# Patient Record
Sex: FEMALE | Race: WHITE | Employment: FULL TIME | URBAN - METROPOLITAN AREA
[De-identification: names, ages, dates, MRNs, and addresses within clinical notes are randomized per-mention and may not be internally consistent; named-entity substitution may affect disease eponyms.]

---

## 2019-01-11 PROCEDURE — 87591 N.GONORRHOEAE DNA AMP PROB: CPT | Performed by: NURSE PRACTITIONER

## 2019-01-11 PROCEDURE — 87491 CHLMYD TRACH DNA AMP PROBE: CPT | Performed by: NURSE PRACTITIONER

## 2019-02-05 PROCEDURE — 87086 URINE CULTURE/COLONY COUNT: CPT | Performed by: UROLOGY

## 2019-02-05 PROCEDURE — 87186 SC STD MICRODIL/AGAR DIL: CPT | Performed by: UROLOGY

## 2019-02-05 PROCEDURE — 87077 CULTURE AEROBIC IDENTIFY: CPT | Performed by: UROLOGY

## 2019-09-20 ENCOUNTER — HOSPITAL ENCOUNTER (INPATIENT)
Facility: HOSPITAL | Age: 29
LOS: 2 days | Discharge: HOME | DRG: 661 | End: 2019-09-22
Attending: HOSPITALIST | Admitting: HOSPITALIST
Payer: COMMERCIAL

## 2019-09-20 ENCOUNTER — APPOINTMENT (OUTPATIENT)
Dept: CT IMAGING | Facility: HOSPITAL | Age: 29
DRG: 661 | End: 2019-09-20
Attending: UROLOGY
Payer: COMMERCIAL

## 2019-09-20 PROBLEM — N12 PYELONEPHRITIS: Status: ACTIVE | Noted: 2019-09-20

## 2019-09-20 PROCEDURE — 81001 URINALYSIS AUTO W/SCOPE: CPT | Performed by: UROLOGY

## 2019-09-20 PROCEDURE — 87086 URINE CULTURE/COLONY COUNT: CPT | Performed by: UROLOGY

## 2019-09-20 PROCEDURE — 80048 BASIC METABOLIC PNL TOTAL CA: CPT | Performed by: UROLOGY

## 2019-09-20 PROCEDURE — 85025 COMPLETE CBC W/AUTO DIFF WBC: CPT | Performed by: UROLOGY

## 2019-09-20 PROCEDURE — 74176 CT ABD & PELVIS W/O CONTRAST: CPT | Performed by: UROLOGY

## 2019-09-20 RX ORDER — MORPHINE SULFATE 4 MG/ML
4 INJECTION, SOLUTION INTRAMUSCULAR; INTRAVENOUS EVERY 2 HOUR PRN
Status: DISCONTINUED | OUTPATIENT
Start: 2019-09-20 | End: 2019-09-22

## 2019-09-20 RX ORDER — LEVONORGESTREL AND ETHINYL ESTRADIOL 0.15-0.03
1 KIT ORAL NIGHTLY
COMMUNITY
End: 2020-02-18

## 2019-09-20 RX ORDER — METOCLOPRAMIDE HYDROCHLORIDE 5 MG/ML
10 INJECTION INTRAMUSCULAR; INTRAVENOUS EVERY 8 HOURS PRN
Status: DISCONTINUED | OUTPATIENT
Start: 2019-09-20 | End: 2019-09-20

## 2019-09-20 RX ORDER — ONDANSETRON 2 MG/ML
4 INJECTION INTRAMUSCULAR; INTRAVENOUS EVERY 6 HOURS PRN
Status: DISCONTINUED | OUTPATIENT
Start: 2019-09-20 | End: 2019-09-22

## 2019-09-20 RX ORDER — SODIUM CHLORIDE 9 MG/ML
INJECTION, SOLUTION INTRAVENOUS CONTINUOUS
Status: DISCONTINUED | OUTPATIENT
Start: 2019-09-20 | End: 2019-09-22

## 2019-09-20 RX ORDER — MORPHINE SULFATE 4 MG/ML
1 INJECTION, SOLUTION INTRAMUSCULAR; INTRAVENOUS EVERY 2 HOUR PRN
Status: DISCONTINUED | OUTPATIENT
Start: 2019-09-20 | End: 2019-09-22

## 2019-09-20 RX ORDER — LEVONORGESTREL AND ETHINYL ESTRADIOL 0.15-0.03
1 KIT ORAL NIGHTLY
Status: DISCONTINUED | OUTPATIENT
Start: 2019-09-20 | End: 2019-09-22

## 2019-09-20 RX ORDER — ACETAMINOPHEN 325 MG/1
650 TABLET ORAL EVERY 6 HOURS PRN
Status: DISCONTINUED | OUTPATIENT
Start: 2019-09-20 | End: 2019-09-22

## 2019-09-20 RX ORDER — MORPHINE SULFATE 4 MG/ML
2 INJECTION, SOLUTION INTRAMUSCULAR; INTRAVENOUS EVERY 2 HOUR PRN
Status: DISCONTINUED | OUTPATIENT
Start: 2019-09-20 | End: 2019-09-22

## 2019-09-20 NOTE — H&P
.CC: renal colic, pyelonephritis     PCP: None Pcp    History of Present Illness: Patient is a 33year old female with PMH sig for nephrolithiasis who presented to [facility] yesterday with severe R flank pain, not alleviated with over the counte pending    ASSESSMENT / PLAN:     1) Staghorn calculus- IVF, morphine prn, zofran prn.   - Urology consult. Will ask Gerard to send available records. Hopefully urology team can access the direct images through [facility] system  - NPO for now    2) ? pyelo

## 2019-09-21 ENCOUNTER — ANESTHESIA (OUTPATIENT)
Dept: SURGERY | Facility: HOSPITAL | Age: 29
DRG: 661 | End: 2019-09-21
Payer: COMMERCIAL

## 2019-09-21 ENCOUNTER — ANESTHESIA EVENT (OUTPATIENT)
Dept: SURGERY | Facility: HOSPITAL | Age: 29
DRG: 661 | End: 2019-09-21
Payer: COMMERCIAL

## 2019-09-21 PROCEDURE — 81025 URINE PREGNANCY TEST: CPT | Performed by: HOSPITALIST

## 2019-09-21 PROCEDURE — 85025 COMPLETE CBC W/AUTO DIFF WBC: CPT | Performed by: HOSPITALIST

## 2019-09-21 PROCEDURE — 80048 BASIC METABOLIC PNL TOTAL CA: CPT | Performed by: HOSPITALIST

## 2019-09-21 PROCEDURE — BT1DYZZ FLUOROSCOPY OF RIGHT KIDNEY, URETER AND BLADDER USING OTHER CONTRAST: ICD-10-PCS | Performed by: UROLOGY

## 2019-09-21 PROCEDURE — 0T768DZ DILATION OF RIGHT URETER WITH INTRALUMINAL DEVICE, VIA NATURAL OR ARTIFICIAL OPENING ENDOSCOPIC: ICD-10-PCS | Performed by: UROLOGY

## 2019-09-21 DEVICE — STENT URET 6F 26CM WO GW INL: Type: IMPLANTABLE DEVICE | Site: URETER | Status: FUNCTIONAL

## 2019-09-21 RX ORDER — LIDOCAINE HYDROCHLORIDE 20 MG/ML
JELLY TOPICAL AS NEEDED
Status: DISCONTINUED | OUTPATIENT
Start: 2019-09-21 | End: 2019-09-21 | Stop reason: HOSPADM

## 2019-09-21 RX ORDER — NALOXONE HYDROCHLORIDE 0.4 MG/ML
80 INJECTION, SOLUTION INTRAMUSCULAR; INTRAVENOUS; SUBCUTANEOUS AS NEEDED
Status: DISCONTINUED | OUTPATIENT
Start: 2019-09-21 | End: 2019-09-21 | Stop reason: HOSPADM

## 2019-09-21 RX ORDER — SODIUM CHLORIDE, SODIUM LACTATE, POTASSIUM CHLORIDE, CALCIUM CHLORIDE 600; 310; 30; 20 MG/100ML; MG/100ML; MG/100ML; MG/100ML
INJECTION, SOLUTION INTRAVENOUS CONTINUOUS
Status: DISCONTINUED | OUTPATIENT
Start: 2019-09-21 | End: 2019-09-21 | Stop reason: HOSPADM

## 2019-09-21 RX ORDER — DEXAMETHASONE SODIUM PHOSPHATE 4 MG/ML
4 VIAL (ML) INJECTION AS NEEDED
Status: DISCONTINUED | OUTPATIENT
Start: 2019-09-21 | End: 2019-09-21 | Stop reason: HOSPADM

## 2019-09-21 RX ORDER — MEPERIDINE HYDROCHLORIDE 25 MG/ML
12.5 INJECTION INTRAMUSCULAR; INTRAVENOUS; SUBCUTANEOUS AS NEEDED
Status: DISCONTINUED | OUTPATIENT
Start: 2019-09-21 | End: 2019-09-21 | Stop reason: HOSPADM

## 2019-09-21 RX ORDER — HYDROMORPHONE HYDROCHLORIDE 1 MG/ML
0.4 INJECTION, SOLUTION INTRAMUSCULAR; INTRAVENOUS; SUBCUTANEOUS EVERY 5 MIN PRN
Status: DISCONTINUED | OUTPATIENT
Start: 2019-09-21 | End: 2019-09-21 | Stop reason: HOSPADM

## 2019-09-21 RX ORDER — ONDANSETRON 2 MG/ML
4 INJECTION INTRAMUSCULAR; INTRAVENOUS AS NEEDED
Status: DISCONTINUED | OUTPATIENT
Start: 2019-09-21 | End: 2019-09-21 | Stop reason: HOSPADM

## 2019-09-21 NOTE — PLAN OF CARE
Problem: Kidney stone, status post cysto and stent placement    Data: Patient alert and oriented. Patient had cystoscopy with stent placement this shift. Patient denies pain now. Patient voiding freely, straining urine, no stone noted at this time.  Patient interventions   Outcome: Progressing  Goal: Patient/Family Short Term Goal  Description  Patient's Short Term Goal:   9/21 AM: Pain control    Interventions:   - IV Morphine  - See additional Care Plan goals for specific interventions   Outcome: Progressin

## 2019-09-21 NOTE — PROGRESS NOTES
I was contacted at [phone] by EDILSON Lou, [facility] about a \"heads up\" on this patient of Dr. Jade Bennett for transfer from [facility] for possible infected/obstructed stone.       I was not contacted until 1953, 4 hours later, about pat

## 2019-09-21 NOTE — ANESTHESIA POSTPROCEDURE EVALUATION
Antonio Patient Status:  Inpatient   Age/Gender 33year old female MRN [id number]   [facility] SURGERY Attending Ana Mendoza MD   Hosp Day # 1 PCP None Pcp       Anesthesia Post-op Note    Procedure(s):  CYSTOSCOP

## 2019-09-21 NOTE — PLAN OF CARE
NURSING ADMISSION NOTE      Patient admitted via direct admit  Oriented to room. Safety precautions initiated. Bed in low position. Call light in reach. Updated history and pta meds.  Gave report to Tomy

## 2019-09-21 NOTE — PLAN OF CARE
Assumed care from [address] [place] [address] at 1500. A&Ox4. VSS. Afebrile. Tolerating regular diet. Voiding well. Straining urine. Denies nausea. Up ad jenny. IVF infusing. POC updated.          Problem: PAIN - ADULT  Goal: Verbalizes/displays adequate comfort level or patie

## 2019-09-21 NOTE — PROGRESS NOTES
Rita Ruiz Hospitalist note    PCP: None Pcp    Chief Complaint:  F/u staghorn calculus    SUBJECTIVE:  Went to OR today. No sig pain, no nausea, no sob.      OBJECTIVE:  Temp:  [97.7 °F (36.5 °C)-99.5 °F (37.5 °C)] 99.5 °F (37.5 °C)  Pulse:  [81-98] 81  Resp: HCl       Assessment/Plan:     1) Staghorn calculus-   - appreciate urology recs  - CT abd done here last night with moderate R hydronephrosis and hydroureter in setting of R staghorn calculus.  None seen on L  - underwent cysto/R ureteral stent placement t

## 2019-09-21 NOTE — ANESTHESIA PREPROCEDURE EVALUATION
PRE-OP EVALUATION    Patient Name: Yessy Beltre    Pre-op Diagnosis: Kidney stone on right side [N20.0]    Procedure(s):  CYSTOSCOPY , RETROGRADE, PYELOGRAM, URETEROSCOPY , STONE MANIPULATION WITH HOLUM LASER AND INSERTION URETERAL STENT- RIGHT    Surg ago)                     Past Surgical History:   Procedure Laterality Date   • CYSTOSCOPY,INSERT URETERAL STENT       Social History    Tobacco Use      Smoking status: Former Smoker        Packs/day: 0.50        Years: 2.00        Pack years: 1        [facility]

## 2019-09-21 NOTE — CONSULTS
[facility]  Report of Consultation    Ericka Mcknight Patient Status:  Inpatient    1990 MRN [id number]   [facility] 3NW-A Attending Felix Magaña MD   Hosp Day # 1 PCP None Pcp     Reason for Consultation:  Right flank pain, ki morphINE sulfate (PF) 4 MG/ML injection 4 mg, 4 mg, Intravenous, Q2H PRN  •  ondansetron HCl (ZOFRAN) injection 4 mg, 4 mg, Intravenous, Q6H PRN  •  CefTRIAXone Sodium (ROCEPHIN) 1 g in sodium chloride 0.9% 100 mL MBP/ADD-vantage, 1 g, Intravenous, Q24H  • will likely requiring several stone surgeries. Discussed risks of surgery including infection/sepsis, bleeding, damage to surrounding organs, ureteral stricture, need for additional surgery. Recommend urorisk and restart medication to prevent stones.

## 2019-09-21 NOTE — OPERATIVE REPORT
[facility]  Operative Note     Dossie Jaime Location: OR   [facility] [id number] MRN [id number]   Admission Date 9/20/2019 Operation Date 9/21/2019   Attending Physician Corinne Foss MD Operating Physician Kayode Patterson MD      Preoperative Stefani properly padded. 24 Lao cystoscopic sheath and apparatus was used to navigate a normal anterior and posterior urethra, entry into the urinary bladder was unremarkable. Cystoscopy demonstrated no retained urothelial mass, tumor, stone, defect.   Fernando

## 2019-09-21 NOTE — PROGRESS NOTES
Pt AOx4. Resting in bed. Denies urinary symptoms. Pain to right flank managed with morphine. Voiding. Up ad jenny. SCDs are on. IVF. Iv placed to right wrist by ICU nurse, tolerated well. Will continue to monitor. 1945 page to urology for new consult.  Ju Smith

## 2019-09-22 VITALS
TEMPERATURE: 99 F | DIASTOLIC BLOOD PRESSURE: 70 MMHG | OXYGEN SATURATION: 100 % | HEART RATE: 80 BPM | RESPIRATION RATE: 15 BRPM | SYSTOLIC BLOOD PRESSURE: 112 MMHG

## 2019-09-22 RX ORDER — PHENAZOPYRIDINE HYDROCHLORIDE 100 MG/1
100 TABLET, FILM COATED ORAL 3 TIMES DAILY PRN
Qty: 20 TABLET | Refills: 0 | Status: SHIPPED | OUTPATIENT
Start: 2019-09-22 | End: 2019-10-04

## 2019-09-22 RX ORDER — PHENAZOPYRIDINE HYDROCHLORIDE 100 MG/1
100 TABLET, FILM COATED ORAL
Status: DISCONTINUED | OUTPATIENT
Start: 2019-09-22 | End: 2019-09-22

## 2019-09-22 RX ORDER — CEPHALEXIN 500 MG/1
500 CAPSULE ORAL 2 TIMES DAILY
Qty: 10 CAPSULE | Refills: 0 | Status: SHIPPED | OUTPATIENT
Start: 2019-09-22 | End: 2019-10-04

## 2019-09-22 NOTE — PROGRESS NOTES
[facility]    Progress Note    Jeovany Andrade Patient Status:  Inpatient    1990 MRN [id number]   [facility] 3NW-A Attending Skinny Sloan MD   [facility] Day # 2 PCP None Pcp        Subjective:   Jeovany Andrade is a(n) 33year old Abdomen+pelvis Kidneystone 2d Rndr(no Iv,no Oral)(cpt=74176)    Result Date: 9/20/2019  CONCLUSION:  1. Moderate right hydronephrosis and hydroureter without obstructing ureteral calculus or other cause.   The possibility of chronic vesicoureteral reflux is

## 2019-09-22 NOTE — PLAN OF CARE
Patient continues to report right flank discomfort but denies need for medication. Complaining of feelings of urgency and requesting Pyridium as this has worked for her in the past. [address] paged regarding Pyridium, will await response.

## 2019-09-22 NOTE — PLAN OF CARE
Problem: PAIN - ADULT  Goal: Verbalizes/displays adequate comfort level or patient's stated pain goal  Description  INTERVENTIONS:  - Encourage pt to monitor pain and request assistance  - Assess pain using appropriate pain scale  - Administer analgesics noted. Patient complaining of  dysuria and frequency but states no worse than she usually has with stents. Tolerating regular diet.   Patient refusing to wear SCD's, writing RN explained the benefits of wearing SCD's to the patient and the risks associated

## 2019-09-22 NOTE — DISCHARGE SUMMARY
[facility] Discharge Summary    Patient ID:  Bhavana Stanford  [id number]  88 year old  11/22/1990    Admit date: 9/20/2019  Discharge date and time: 9/22/19  Attending Physician: Radha Loera MD  Primary Care Physician: None Pcp     Admit Dx ambulance- pt was therefore not sent with any records. Hospital Course:   1) Staghorn calculus-   - appreciate urology recs  - CT abd done here with moderate R hydronephrosis and hydroureter in setting of R staghorn calculus.  None seen on L  - underwen ORAL) (CPT=74176)  COMPARISON:  None. INDICATIONS:  kidney stone  TECHNIQUE:  Unenhanced multislice CT scanning from above the kidneys to below the urinary bladder.   2D rendering are generated on the CT scanner workstation to localize potential stones in vesicoureteral reflux is considered. 2. Right nephrolithiasis with large staghorn calculus. A separate 14 mm lower pole calculus associated with cortical thinning and may be within a calyceal diverticulum.    Dictated by: Trevor Juan MD on 9/20/2019 at 2

## 2019-10-05 PROCEDURE — 87086 URINE CULTURE/COLONY COUNT: CPT | Performed by: PHYSICIAN ASSISTANT

## 2019-10-10 ENCOUNTER — APPOINTMENT (OUTPATIENT)
Dept: CT IMAGING | Facility: HOSPITAL | Age: 29
End: 2019-10-10
Attending: EMERGENCY MEDICINE
Payer: COMMERCIAL

## 2019-10-10 ENCOUNTER — HOSPITAL ENCOUNTER (OUTPATIENT)
Facility: HOSPITAL | Age: 29
Setting detail: OBSERVATION
Discharge: HOME | End: 2019-10-12
Attending: EMERGENCY MEDICINE | Admitting: HOSPITALIST
Payer: COMMERCIAL

## 2019-10-10 DIAGNOSIS — R11.2 NAUSEA AND VOMITING IN ADULT: ICD-10-CM

## 2019-10-10 DIAGNOSIS — N20.0 STAGHORN CALCULUS: ICD-10-CM

## 2019-10-10 DIAGNOSIS — R10.9 RIGHT FLANK PAIN: Primary | ICD-10-CM

## 2019-10-10 PROCEDURE — 99220 INITIAL OBSERVATION CARE,LEVL III: CPT | Performed by: HOSPITALIST

## 2019-10-10 PROCEDURE — 74176 CT ABD & PELVIS W/O CONTRAST: CPT | Performed by: EMERGENCY MEDICINE

## 2019-10-10 RX ORDER — IBUPROFEN 200 MG
200 TABLET ORAL EVERY 4 HOURS PRN
Status: DISCONTINUED | OUTPATIENT
Start: 2019-10-10 | End: 2019-10-12

## 2019-10-10 RX ORDER — MORPHINE SULFATE 4 MG/ML
2 INJECTION, SOLUTION INTRAMUSCULAR; INTRAVENOUS EVERY 2 HOUR PRN
Status: DISCONTINUED | OUTPATIENT
Start: 2019-10-10 | End: 2019-10-12

## 2019-10-10 RX ORDER — ONDANSETRON 2 MG/ML
4 INJECTION INTRAMUSCULAR; INTRAVENOUS EVERY 6 HOURS PRN
Status: DISCONTINUED | OUTPATIENT
Start: 2019-10-10 | End: 2019-10-12

## 2019-10-10 RX ORDER — IBUPROFEN 600 MG/1
600 TABLET ORAL EVERY 4 HOURS PRN
Status: DISCONTINUED | OUTPATIENT
Start: 2019-10-10 | End: 2019-10-12

## 2019-10-10 RX ORDER — IBUPROFEN 400 MG/1
400 TABLET ORAL EVERY 4 HOURS PRN
Status: DISCONTINUED | OUTPATIENT
Start: 2019-10-10 | End: 2019-10-12

## 2019-10-10 RX ORDER — ACETAMINOPHEN 325 MG/1
650 TABLET ORAL EVERY 6 HOURS PRN
Status: DISCONTINUED | OUTPATIENT
Start: 2019-10-10 | End: 2019-10-12

## 2019-10-10 RX ORDER — ONDANSETRON 2 MG/ML
4 INJECTION INTRAMUSCULAR; INTRAVENOUS ONCE
Status: COMPLETED | OUTPATIENT
Start: 2019-10-10 | End: 2019-10-10

## 2019-10-10 RX ORDER — MORPHINE SULFATE 4 MG/ML
4 INJECTION, SOLUTION INTRAMUSCULAR; INTRAVENOUS EVERY 2 HOUR PRN
Status: DISCONTINUED | OUTPATIENT
Start: 2019-10-10 | End: 2019-10-12

## 2019-10-10 RX ORDER — MORPHINE SULFATE 4 MG/ML
4 INJECTION, SOLUTION INTRAMUSCULAR; INTRAVENOUS ONCE
Status: COMPLETED | OUTPATIENT
Start: 2019-10-10 | End: 2019-10-10

## 2019-10-10 RX ORDER — PHENAZOPYRIDINE HYDROCHLORIDE 200 MG/1
200 TABLET, FILM COATED ORAL 3 TIMES DAILY PRN
Status: DISCONTINUED | OUTPATIENT
Start: 2019-10-10 | End: 2019-10-12

## 2019-10-10 RX ORDER — SODIUM CHLORIDE 9 MG/ML
INJECTION, SOLUTION INTRAVENOUS CONTINUOUS
Status: ACTIVE | OUTPATIENT
Start: 2019-10-10 | End: 2019-10-11

## 2019-10-10 RX ORDER — HYDROMORPHONE HYDROCHLORIDE 1 MG/ML
0.5 INJECTION, SOLUTION INTRAMUSCULAR; INTRAVENOUS; SUBCUTANEOUS EVERY 30 MIN PRN
Status: ACTIVE | OUTPATIENT
Start: 2019-10-10 | End: 2019-10-11

## 2019-10-10 RX ORDER — LEVONORGESTREL AND ETHINYL ESTRADIOL 0.15-0.03
1 KIT ORAL NIGHTLY
Status: DISCONTINUED | OUTPATIENT
Start: 2019-10-10 | End: 2019-10-12

## 2019-10-10 RX ORDER — ENOXAPARIN SODIUM 100 MG/ML
40 INJECTION SUBCUTANEOUS DAILY
Status: DISCONTINUED | OUTPATIENT
Start: 2019-10-10 | End: 2019-10-12

## 2019-10-10 RX ORDER — SODIUM CHLORIDE 9 MG/ML
INJECTION, SOLUTION INTRAVENOUS CONTINUOUS
Status: DISCONTINUED | OUTPATIENT
Start: 2019-10-10 | End: 2019-10-12

## 2019-10-10 RX ORDER — MORPHINE SULFATE 4 MG/ML
1 INJECTION, SOLUTION INTRAMUSCULAR; INTRAVENOUS EVERY 2 HOUR PRN
Status: DISCONTINUED | OUTPATIENT
Start: 2019-10-10 | End: 2019-10-12

## 2019-10-11 PROCEDURE — 99225 SUBSEQUENT OBSERVATION CARE: CPT | Performed by: INTERNAL MEDICINE

## 2019-10-11 RX ORDER — CETIRIZINE HYDROCHLORIDE 10 MG/1
10 TABLET ORAL AS NEEDED
COMMUNITY
End: 2020-02-18 | Stop reason: ALTCHOICE

## 2019-10-11 NOTE — H&P
[facility]  History and Physical     Danaeeliel Sims Patient Status:  Emergency    1990 MRN [id number]   Location [address] [facility] Attending Ozzy Weir MD   Hosp Day # 0 PCP None Pcp     Chief Complaint: flank Grandfather 64        esophageal   • Alcohol and Other Disorders Associated Paternal Grandmother    • Other (Other) Paternal Grandmother         chirrosis liver   • Alcohol and Other Disorders Associated Paternal Grandfather    • Other (Other) Paternal Gra GFRNAA 87   CA 9.2   ALB 3.5   *   K 4.1      CO2 23.0   ALKPHO 67   AST 34   ALT 30   BILT 0.4   TP 7.8     No results for input(s): PTP, INR in the last 168 hours. No results for input(s): TROP, CK in the last 168 hours.   Imaging: Imaging

## 2019-10-11 NOTE — CONSULTS
[facility] Urology   Consultation Note    Catina Dan Patient Status:  Observation    1990 MRN [id number]   Location [address] [place] [address] Attending Yasmeen Crouch MD   Hosp Day # 0 PCP None Pcp     Reason for Consultation:  Flank Other (Other) Paternal Grandmother         chirrosis liver   • Alcohol and Other Disorders Associated Paternal Grandfather    • Other (Other) Paternal Grandfather         jorge      reports that she quit smoking about 11 years ago.  She has a 1.00 pack BACK: Without CVA tenderness. SKIN: Without stigmata. NEUROLOGIC: Grossly intact. EXTREMITIES: Without edema. Laboratory Data:  Lab Results   Component Value Date    WBC 8.1 10/10/2019    HGB 13.2 10/10/2019    HCT 38.8 10/10/2019    . 0 pleural disease. BLADDER:  Negative.       =====  CONCLUSION:    1. Moderate right hydronephrosis and hydroureter without obstructing ureteral calculus or other cause. The possibility of chronic vesicoureteral reflux is considered.   2. Right nephrolith stones     Hematuria     Pyuria     Ureteral stone     Kidney stone     Staghorn calculus     Flank pain     Ureteral stone with hydronephrosis     Renal colic     Renal stone     Pyelonephritis     Right flank pain     Nausea and vomiting in adult    [facility]

## 2019-10-11 NOTE — ED INITIAL ASSESSMENT (HPI)
A/O x 4. Patient presents with right flank pain. Patient has a history of kidney stones, had a stent placed a few weeks ago and has not been able to see the urologist.  Reports nausea and vomiting.

## 2019-10-11 NOTE — PROGRESS NOTES
[facility]  Progress Note     Tisha Ayala Patient Status:  Observation    1990 MRN [id number]   Location [address] [place] [address]-A Attending Adan Ochoa MD   Hosp Day # 0 PCP None Pcp     Chief Complaint: flank pain    S: Patient with 1. Right hydroureteronephrosis due to Staghorn stone-   1. S/p stent  2. Pain control   3. abx - awaiting Ucx  4. Antiemetic   5. Urology consult   6. Nephrostomy tube as outpatient. 2. UTI- abx and f/u Cx   3.  Hyponatremia- IVF and monitor      Qual

## 2019-10-11 NOTE — PLAN OF CARE
NURSING ADMISSION NOTE      Patient admitted via cart from ER to Rm 0018  C/o right flank pain,nausea vomiting  Mild right flank pain, refused pain med. Denies nausea  Able to eat gabriel crackers, drank apple juice  NPO post MN  IVF.   Urology to see, pe patient/family/discharge partner in discharge planning  - Arrange for needed discharge resources and transportation as appropriate  - Identify discharge learning needs (meds, wound care, etc)  - Arrange for interpreters to assist at discharge as needed  -

## 2019-10-11 NOTE — ED PROVIDER NOTES
Patient Seen in: [facility] Emergency Department      History   Patient presents with:  Abdomen/Flank Pain (GI/)    Stated Complaint: flank pain, recent kidney stones    HPI    CHIEF COMPLAINT: Right flank pain, recently diagnosed staghorn calculu noncontributory to the presenting problem, except as indicated as above.     Past Medical History:   Diagnosis Date   • Bilateral congenital primary hydronephrosis 1997   • Calculus of kidney    • History of kidney stones    • Seizure disorder ([facility] [address] hypertrophy. no trismus or stridor. Uvula midline. No phonation changes, patient handling secretions well.  Mucous membranes moist.  Respiratory: there are no retractions, lungs are clear to auscultation  Cardiovascular: regular rate and rhythm  Shanelle Juarez CBC W/ DIFFERENTIAL[[id number]]                              Final result                 Please view results for these tests on the individual orders.    POCT PREGNANCY, URINE   RAINBOW DRAW BLUE   RAINBOW DRAW LAVENDER   RAINBOW DRAW LIGHT GREEN   Yesica Willis Stable. Normal bowel caliber. No colonic inflammation. Moderate to large amount of stool scattered throughout the colon. No ascites. Normal appendix. ABDOMINAL WALL:  Stable. Umbilical eventration. BONES:  Stable.   Normal vertebral body heights and d obstructing ureteral calculus is identified. No left urinary tract calculus or hydronephrosis. ADRENALS:  No mass or enlargement. LIVER:  No enlargement, atrophy, abnormal density, or significant focal lesion.   BILIARY:  No visible dilatation or calcific followed by urology. Consults were called by Dr. Shasta Varghese, please see his note for further details. This patient was seen and examined with Dr. Shasta Varghese, who agrees with the disposition and plan.         Disposition and Plan     Clinical Impression:  Right fl

## 2019-10-12 VITALS
RESPIRATION RATE: 18 BRPM | HEART RATE: 104 BPM | TEMPERATURE: 99 F | SYSTOLIC BLOOD PRESSURE: 121 MMHG | WEIGHT: 150 LBS | OXYGEN SATURATION: 100 % | BODY MASS INDEX: 24.11 KG/M2 | DIASTOLIC BLOOD PRESSURE: 70 MMHG | HEIGHT: 66 IN

## 2019-10-12 PROCEDURE — 99217 OBSERVATION CARE DISCHARGE: CPT | Performed by: INTERNAL MEDICINE

## 2019-10-12 RX ORDER — CEPHALEXIN 500 MG/1
500 CAPSULE ORAL 3 TIMES DAILY
Qty: 30 CAPSULE | Refills: 0 | Status: SHIPPED | OUTPATIENT
Start: 2019-10-12 | End: 2019-10-22

## 2019-10-12 NOTE — PLAN OF CARE
Pt seen and evaluated by hospitalist disposition for discharge home. Verbal and written discharge instructions reviewed with pt including reback and verbal understanding of information. Ride not available until [id number].  Information provided to be discharged

## 2019-10-12 NOTE — PROGRESS NOTES
[facility]    Progress Note    Nunu Arteaga Patient Status:  Observation    1990 MRN [id number]   [facility] 0SW-A Attending Toni Cifuentes MD   Hosp Day # 0 PCP None Pcp     Subjective:  Nunu Arteaga is a(n) 33 year

## 2019-10-12 NOTE — DISCHARGE SUMMARY
[facility]  DISCHARGE SUMMARY     [facility] Patient Status:  Observation    1990 MRN [id number]   [facility] 0SW-A Attending No att. providers found   Hosp Day # 0 PCP None Pcp     Date of Admission: 10/10/2019  Date CONTINUE taking these medications      Instructions Prescription details   cetirizine 10 MG Tabs  Commonly known as:  ZYRTEC      Take 10 mg by mouth as needed for Allergies.    Refills:  0     KURVELO 0.15-30 MG-MCG Tabs  Generic drug:  Levonorgestrel-Et edema.  -----------------------------------------------------------------------------------------------  PATIENT DISCHARGE INSTRUCTIONS: See electronic chart    Vasyl Dela Cruz MD    Time spent:  > 30 minutes

## 2019-10-12 NOTE — PLAN OF CARE
Assumed care of patient at 1. VSS. IVF infusing. IV rocephin. Straining all urine. C/o R flank pain denies needs for meds.  Will continue to monitor

## 2019-10-12 NOTE — PLAN OF CARE
Pt care assumed this am, assessment completed, reported pain in pelvis urethra prior to void and burning with urination. Medicated with tylenol versus motrin for pain and prn pyridium. IVF rate decreased to encourage increased po intake.  Ambulating in room

## 2019-10-15 ENCOUNTER — PATIENT OUTREACH (OUTPATIENT)
Dept: CASE MANAGEMENT | Age: 29
End: 2019-10-15

## 2019-10-15 DIAGNOSIS — Z02.9 ENCOUNTERS FOR UNSPECIFIED ADMINISTRATIVE PURPOSE: ICD-10-CM

## 2019-10-15 NOTE — PROGRESS NOTES
Evans Rodriguez [address]      HISTORY   CHIEF COMPLAINT: post hospital follow up visit  HPI: Norma Laguerre is a 33year old female here today for follow up after being hospitalized for recurrent flank pain.   [facility] Paul OR   • CYSTOSCOPY URETEROSCOPY Right 9/21/2019    Performed by Patrick Mei MD at [facility] MAIN OR   • Cas     • LITHOTRIPSY  2009   • OTHER      congenital hydronephrosis at age 4   • OTHER SURGICAL HISTORY  11/18/2014    cysto PM    CREATSERUM 0.90 10/10/2019 07:51 PM    CA 9.2 10/10/2019 07:51 PM    MG 1.5 (L) 10/26/2014 05:49 AM    ALB 3.5 10/10/2019 07:51 PM    ALT 30 10/10/2019 07:51 PM    AST 34 10/10/2019 07:51 PM    INR 1.0 07/31/2015 09:08 AM       Immunization History hours as needed for pain, Disp: 30 tablet, Rfl: 0  cephALEXin 500 MG Oral Cap, Take 1 capsule (500 mg total) by mouth 3 (three) times daily for 10 days. , Disp: 30 capsule, Rfl: 0  cetirizine 10 MG Oral Tab, Take 10 mg by mouth as needed for Allergies. , Dis Decision Making- Based on service period of discharge to 30 days:   · Number of Possible Diagnoses and/or Management Options: moderate  · Amount and/or Complexity of Data to Be Reviewed: moderate  · Risk of Significant Complications, Morbidity, and/or Mort

## 2019-10-15 NOTE — PROGRESS NOTES
LM for pt to call NCM back for TCM. Pt has appt at [facility] tomorrow. TCC was ordered at d/c.

## 2019-10-16 ENCOUNTER — TELEPHONE (OUTPATIENT)
Dept: INTERNAL MEDICINE CLINIC | Facility: CLINIC | Age: 29
End: 2019-10-16

## 2019-10-16 ENCOUNTER — OFFICE VISIT (OUTPATIENT)
Dept: INTERNAL MEDICINE CLINIC | Facility: CLINIC | Age: 29
End: 2019-10-16

## 2019-10-16 VITALS
SYSTOLIC BLOOD PRESSURE: 112 MMHG | RESPIRATION RATE: 16 BRPM | BODY MASS INDEX: 24.59 KG/M2 | HEIGHT: 66 IN | DIASTOLIC BLOOD PRESSURE: 78 MMHG | OXYGEN SATURATION: 94 % | TEMPERATURE: 98 F | WEIGHT: 153 LBS | HEART RATE: 94 BPM

## 2019-10-16 DIAGNOSIS — N20.0 STAGHORN CALCULUS: ICD-10-CM

## 2019-10-16 DIAGNOSIS — N13.2 URETERAL STONE WITH HYDRONEPHROSIS: Primary | ICD-10-CM

## 2019-10-16 PROCEDURE — 1111F DSCHRG MED/CURRENT MED MERGE: CPT | Performed by: CLINICAL NURSE SPECIALIST

## 2019-10-16 PROCEDURE — 99495 TRANSJ CARE MGMT MOD F2F 14D: CPT | Performed by: CLINICAL NURSE SPECIALIST

## 2019-10-16 RX ORDER — IBUPROFEN 200 MG
400 TABLET ORAL EVERY 6 HOURS PRN
COMMUNITY
End: 2019-10-16

## 2019-10-16 RX ORDER — HYDROCODONE BITARTRATE AND ACETAMINOPHEN 5; 325 MG/1; MG/1
TABLET ORAL
Qty: 30 TABLET | Refills: 0 | Status: SHIPPED | OUTPATIENT
Start: 2019-10-16 | End: 2020-02-18 | Stop reason: ALTCHOICE

## 2019-10-16 NOTE — PROGRESS NOTES
[facility] PHARMACIST MEDICATION RECONCILIATION        Patrick Acharya MRN [id number]    1990 PCP None Pcp       Comments: Medication history completed in [address] [place] [address] by pharmacist with the patient.       The following

## 2019-10-16 NOTE — TELEPHONE ENCOUNTER
Spoke with patient to inform her that the Physician Assistant Royal Hendrickson) from Urology should be contacting her tomorrow regarding continuing antibiotic therapy until her procedure.    Also informed patient that we have contacted Dr. Alex Lopez' s office at [facility]

## 2019-10-16 NOTE — PATIENT INSTRUCTIONS
Patient Instructions:    1. We will contact your urologist today regarding the antibiotic to see if you need to continue it until the day of surgery. We will also request an earlier surgery date. We will contact you once we speak with them.      2. Disconti

## 2019-10-18 NOTE — PROGRESS NOTES
Multiple attempts to reach pt and messages left with no return call. Pt went in for TCC appt on 10/16/19. Encounter closing.

## 2022-04-07 ENCOUNTER — HOSPITAL ENCOUNTER (OUTPATIENT)
Age: 32
Discharge: HOME | End: 2022-04-07
Attending: EMERGENCY MEDICINE
Payer: COMMERCIAL

## 2022-04-07 ENCOUNTER — APPOINTMENT (OUTPATIENT)
Dept: CT IMAGING | Age: 32
End: 2022-04-07
Attending: EMERGENCY MEDICINE
Payer: COMMERCIAL

## 2022-04-07 ENCOUNTER — APPOINTMENT (OUTPATIENT)
Dept: URGENT CARE | Age: 32
End: 2022-04-07

## 2022-04-07 VITALS
HEIGHT: 67 IN | BODY MASS INDEX: 21.19 KG/M2 | SYSTOLIC BLOOD PRESSURE: 125 MMHG | RESPIRATION RATE: 16 BRPM | OXYGEN SATURATION: 100 % | HEART RATE: 76 BPM | WEIGHT: 135 LBS | DIASTOLIC BLOOD PRESSURE: 66 MMHG | TEMPERATURE: 99 F

## 2022-04-07 DIAGNOSIS — N12 PYELONEPHRITIS: Primary | ICD-10-CM

## 2022-04-07 DIAGNOSIS — R10.9 ABDOMINAL PAIN OF UNKNOWN ETIOLOGY: ICD-10-CM

## 2022-04-07 DIAGNOSIS — N20.0 KIDNEY STONE: ICD-10-CM

## 2022-04-07 LAB
#MXD IC: 0.8 X10ˆ3/UL (ref 0.1–1)
B-HCG UR QL: NEGATIVE
BUN BLD-MCNC: 13 MG/DL (ref 7–18)
CHLORIDE BLD-SCNC: 102 MMOL/L (ref 98–112)
CO2 BLD-SCNC: 25 MMOL/L (ref 21–32)
CREAT BLD-MCNC: 0.5 MG/DL
GLUCOSE BLD-MCNC: 87 MG/DL (ref 70–99)
HCT VFR BLD AUTO: 41.3 %
HCT VFR BLD CALC: 43 %
HGB BLD-MCNC: 13.8 G/DL
ISTAT IONIZED CALCIUM FOR CHEM 8: 1.16 MMOL/L (ref 1.12–1.32)
LYMPHOCYTES # BLD AUTO: 2.3 X10ˆ3/UL (ref 1–4)
LYMPHOCYTES NFR BLD AUTO: 28.1 %
MCH RBC QN AUTO: 30.8 PG (ref 26–34)
MCHC RBC AUTO-ENTMCNC: 33.4 G/DL (ref 31–37)
MCV RBC AUTO: 92.2 FL (ref 80–100)
MIXED CELL %: 9.8 %
NEUTROPHILS # BLD AUTO: 5.1 X10ˆ3/UL (ref 1.5–7.7)
NEUTROPHILS NFR BLD AUTO: 62.1 %
PLATELET # BLD AUTO: 204 X10ˆ3/UL (ref 150–450)
POCT BILIRUBIN URINE: NEGATIVE
POCT GLUCOSE URINE: NEGATIVE MG/DL
POCT KETONE URINE: NEGATIVE MG/DL
POCT NITRITE URINE: POSITIVE
POCT PH URINE: 7.5 (ref 5–8)
POCT PROTEIN URINE: NEGATIVE MG/DL
POCT SPECIFIC GRAVITY URINE: 1.01
POCT UROBILINOGEN URINE: 0.2 MG/DL
POTASSIUM BLD-SCNC: 4 MMOL/L (ref 3.6–5.1)
RBC # BLD AUTO: 4.48 X10ˆ6/UL
SODIUM BLD-SCNC: 135 MMOL/L (ref 136–145)
WBC # BLD AUTO: 8.2 X10ˆ3/UL (ref 4–11)

## 2022-04-07 PROCEDURE — 81002 URINALYSIS NONAUTO W/O SCOPE: CPT | Performed by: EMERGENCY MEDICINE

## 2022-04-07 PROCEDURE — 87186 SC STD MICRODIL/AGAR DIL: CPT | Performed by: EMERGENCY MEDICINE

## 2022-04-07 PROCEDURE — 96361 HYDRATE IV INFUSION ADD-ON: CPT

## 2022-04-07 PROCEDURE — 87077 CULTURE AEROBIC IDENTIFY: CPT | Performed by: EMERGENCY MEDICINE

## 2022-04-07 PROCEDURE — 99215 OFFICE O/P EST HI 40 MIN: CPT

## 2022-04-07 PROCEDURE — 85025 COMPLETE CBC W/AUTO DIFF WBC: CPT | Performed by: EMERGENCY MEDICINE

## 2022-04-07 PROCEDURE — 81025 URINE PREGNANCY TEST: CPT

## 2022-04-07 PROCEDURE — 96365 THER/PROPH/DIAG IV INF INIT: CPT

## 2022-04-07 PROCEDURE — 99214 OFFICE O/P EST MOD 30 MIN: CPT

## 2022-04-07 PROCEDURE — 96375 TX/PRO/DX INJ NEW DRUG ADDON: CPT

## 2022-04-07 PROCEDURE — 74176 CT ABD & PELVIS W/O CONTRAST: CPT | Performed by: EMERGENCY MEDICINE

## 2022-04-07 PROCEDURE — 80047 BASIC METABLC PNL IONIZED CA: CPT

## 2022-04-07 PROCEDURE — 87086 URINE CULTURE/COLONY COUNT: CPT | Performed by: EMERGENCY MEDICINE

## 2022-04-07 RX ORDER — HYDROCODONE BITARTRATE AND ACETAMINOPHEN 5; 325 MG/1; MG/1
1-2 TABLET ORAL EVERY 6 HOURS PRN
Qty: 10 TABLET | Refills: 0 | Status: SHIPPED | OUTPATIENT
Start: 2022-04-07 | End: 2022-04-12

## 2022-04-07 RX ORDER — ONDANSETRON 2 MG/ML
4 INJECTION INTRAMUSCULAR; INTRAVENOUS ONCE
Status: COMPLETED | OUTPATIENT
Start: 2022-04-07 | End: 2022-04-07

## 2022-04-07 RX ORDER — KETOROLAC TROMETHAMINE 30 MG/ML
15 INJECTION, SOLUTION INTRAMUSCULAR; INTRAVENOUS ONCE
Status: COMPLETED | OUTPATIENT
Start: 2022-04-07 | End: 2022-04-07

## 2022-04-07 RX ORDER — SODIUM CHLORIDE 9 MG/ML
1000 INJECTION, SOLUTION INTRAVENOUS ONCE
Status: COMPLETED | OUTPATIENT
Start: 2022-04-07 | End: 2022-04-07

## 2022-04-07 RX ORDER — CEFADROXIL 500 MG/1
500 CAPSULE ORAL 2 TIMES DAILY
Qty: 20 CAPSULE | Refills: 0 | Status: SHIPPED | OUTPATIENT
Start: 2022-04-07 | End: 2022-04-17

## 2022-04-07 RX ORDER — ONDANSETRON 4 MG/1
4 TABLET, ORALLY DISINTEGRATING ORAL ONCE
Status: COMPLETED | OUTPATIENT
Start: 2022-04-07 | End: 2022-04-07

## 2022-04-07 NOTE — ED INITIAL ASSESSMENT (HPI)
C/O a week of right flank pain radiates to abdominal area, yesterday got worse.  Hx of kidney stones

## 2022-04-11 NOTE — ED NOTES
On appropriate antibiotic for the organism that was found in urine culture.  Told pt via my chart messager,   We hope you are feeling better and call us if you have any further questions

## 2022-12-16 ENCOUNTER — APPOINTMENT (OUTPATIENT)
Dept: CT IMAGING | Age: 32
End: 2022-12-16
Attending: EMERGENCY MEDICINE
Payer: COMMERCIAL

## 2022-12-16 ENCOUNTER — HOSPITAL ENCOUNTER (OUTPATIENT)
Age: 32
Discharge: ANOTHER HOSPITAL | End: 2022-12-16
Attending: EMERGENCY MEDICINE
Payer: COMMERCIAL

## 2022-12-16 VITALS
HEIGHT: 67 IN | SYSTOLIC BLOOD PRESSURE: 128 MMHG | RESPIRATION RATE: 18 BRPM | BODY MASS INDEX: 22.76 KG/M2 | DIASTOLIC BLOOD PRESSURE: 85 MMHG | WEIGHT: 145 LBS | HEART RATE: 84 BPM | OXYGEN SATURATION: 97 % | TEMPERATURE: 99 F

## 2022-12-16 DIAGNOSIS — N23 RENAL COLIC: Primary | ICD-10-CM

## 2022-12-16 DIAGNOSIS — R10.9 FLANK PAIN: ICD-10-CM

## 2022-12-16 DIAGNOSIS — N13.30 HYDRONEPHROSIS OF RIGHT KIDNEY: ICD-10-CM

## 2022-12-16 LAB
#MXD IC: 0.6 X10ˆ3/UL (ref 0.1–1)
B-HCG UR QL: NEGATIVE
BUN BLD-MCNC: 16 MG/DL (ref 7–18)
CHLORIDE BLD-SCNC: 98 MMOL/L (ref 98–112)
CO2 BLD-SCNC: 31 MMOL/L (ref 21–32)
CREAT BLD-MCNC: 0.8 MG/DL
GFR SERPLBLD BASED ON 1.73 SQ M-ARVRAT: 100 ML/MIN/1.73M2 (ref 60–?)
GLUCOSE BLD-MCNC: 91 MG/DL (ref 70–99)
HCT VFR BLD AUTO: 41.3 %
HCT VFR BLD CALC: 42 %
HGB BLD-MCNC: 13.3 G/DL
ISTAT IONIZED CALCIUM FOR CHEM 8: 1.23 MMOL/L (ref 1.12–1.32)
LYMPHOCYTES # BLD AUTO: 2 X10ˆ3/UL (ref 1–4)
LYMPHOCYTES NFR BLD AUTO: 26.1 %
MCH RBC QN AUTO: 30.1 PG (ref 26–34)
MCHC RBC AUTO-ENTMCNC: 32.2 G/DL (ref 31–37)
MCV RBC AUTO: 93.4 FL (ref 80–100)
MIXED CELL %: 8.5 %
NEUTROPHILS # BLD AUTO: 5 X10ˆ3/UL (ref 1.5–7.7)
NEUTROPHILS NFR BLD AUTO: 65.4 %
PLATELET # BLD AUTO: 205 X10ˆ3/UL (ref 150–450)
POCT BILIRUBIN URINE: NEGATIVE
POCT BLOOD URINE: NEGATIVE
POCT GLUCOSE URINE: NEGATIVE MG/DL
POCT KETONE URINE: NEGATIVE MG/DL
POCT LEUKOCYTE ESTERASE URINE: NEGATIVE
POCT NITRITE URINE: NEGATIVE
POCT PH URINE: 6 (ref 5–8)
POCT SPECIFIC GRAVITY URINE: 1.03
POCT URINE CLARITY: CLEAR
POCT URINE COLOR: YELLOW
POCT UROBILINOGEN URINE: 0.2 MG/DL
POTASSIUM BLD-SCNC: 4.2 MMOL/L (ref 3.6–5.1)
RBC # BLD AUTO: 4.42 X10ˆ6/UL
SODIUM BLD-SCNC: 136 MMOL/L (ref 136–145)
WBC # BLD AUTO: 7.6 X10ˆ3/UL (ref 4–11)

## 2022-12-16 PROCEDURE — 81002 URINALYSIS NONAUTO W/O SCOPE: CPT | Performed by: EMERGENCY MEDICINE

## 2022-12-16 PROCEDURE — 85025 COMPLETE CBC W/AUTO DIFF WBC: CPT | Performed by: EMERGENCY MEDICINE

## 2022-12-16 PROCEDURE — 96374 THER/PROPH/DIAG INJ IV PUSH: CPT

## 2022-12-16 PROCEDURE — 81025 URINE PREGNANCY TEST: CPT

## 2022-12-16 PROCEDURE — 99215 OFFICE O/P EST HI 40 MIN: CPT

## 2022-12-16 PROCEDURE — 74176 CT ABD & PELVIS W/O CONTRAST: CPT | Performed by: EMERGENCY MEDICINE

## 2022-12-16 PROCEDURE — 80047 BASIC METABLC PNL IONIZED CA: CPT

## 2022-12-16 RX ORDER — TRAMADOL HYDROCHLORIDE 50 MG/1
TABLET ORAL AS NEEDED
COMMUNITY
Start: 2022-12-08

## 2022-12-16 RX ORDER — METHENAMINE HIPPURATE 1000 MG/1
1 TABLET ORAL 2 TIMES DAILY
COMMUNITY
Start: 2022-11-14

## 2022-12-16 RX ORDER — KETOROLAC TROMETHAMINE 30 MG/ML
30 INJECTION, SOLUTION INTRAMUSCULAR; INTRAVENOUS ONCE
Status: COMPLETED | OUTPATIENT
Start: 2022-12-16 | End: 2022-12-16

## 2022-12-16 RX ORDER — ESCITALOPRAM OXALATE 10 MG/1
TABLET ORAL
COMMUNITY
Start: 2022-10-10

## 2022-12-16 RX ORDER — FLUTICASONE PROPIONATE 50 MCG
SPRAY, SUSPENSION (ML) NASAL DAILY
COMMUNITY

## 2022-12-16 NOTE — ED INITIAL ASSESSMENT (HPI)
Dr. Donn Mosquera has already assessed patient. Patient here for evaluation of right flank pain radiating to the right lower abdomen. States she has a history of kidney stones since the age of 4 and last one was 10/2022.  She does follow up with a urologist and has an upcoming appointment with a nephrologist.

## 2024-09-12 ENCOUNTER — LAB REQUISITION (OUTPATIENT)
Dept: LAB | Facility: HOSPITAL | Age: 34
End: 2024-09-12
Payer: COMMERCIAL

## 2024-09-12 DIAGNOSIS — N13.2 HYDRONEPHROSIS WITH RENAL AND URETERAL CALCULOUS OBSTRUCTION: ICD-10-CM

## 2024-09-12 DIAGNOSIS — N20.1 CALCULUS OF URETER: ICD-10-CM

## 2024-09-12 PROCEDURE — 88300 SURGICAL PATH GROSS: CPT | Performed by: UROLOGY

## 2024-09-12 PROCEDURE — 82365 CALCULUS SPECTROSCOPY: CPT | Performed by: UROLOGY

## 2024-09-24 LAB
CAOX DIHYDRATE: 40 %
CAOX MONOHYDRATE: 30 %
HYDROXYAPATITE: 30 %
WEIGHT-STONE: 19 MG

## (undated) DEVICE — Device

## (undated) DEVICE — NITINOL WIRE STR 035

## (undated) DEVICE — TIGERTAIL 5F FLXTIP 70CM

## (undated) DEVICE — SYRINGE 20CC LL TIP

## (undated) DEVICE — KENDALL SCD EXPRESS SLEEVES, KNEE LENGTH, MEDIUM: Brand: KENDALL SCD

## (undated) DEVICE — SOL  .9 3000ML

## (undated) DEVICE — CYSTO CDS-LF: Brand: MEDLINE INDUSTRIES, INC.

## (undated) DEVICE — ADHESIVE MASTISOL 2/3CC VL

## (undated) NOTE — Clinical Note
Your patient was recently seen at the [facility] for a hospital follow-up visit. The visit note is attached. Please contact the clinic with any questions at [phone].     Thank you,  SINDHU Townsend

## (undated) NOTE — LETTER
Danna Del Toro [address] [address]  [place], [address] [facility]    Consent for Operation  Date: __________________                                Time: _______________    1.  I authorize the performance upon Thi Shaw the following operation: cystoscop procedure has been videotaped, the surgeon will obtain the original videotape. The hospital will not be responsible for storage or maintenance of this tape.   6. For the purpose of advancing medical education, I consent to the admittance of observers to the STATEMENTS REQUIRING INSERTION OR COMPLETION WERE FILLED IN.     Signature of Patient:   ___________________________    When the patient is a minor or mentally incompetent to give consent:  Signature of person authorized to consent for patient: ____________ supplements, and pills I can buy without a prescription (including street drugs/illegal medications). Failure to inform my anesthesiologist about these medicines may increase my risk of anesthetic complications. iv.  If I am allergic to anything or have ha Anesthesiologist Signature     Date   Time  I have discussed the procedure and information above with the patient (or patient’s representative) and answered their questions. The patient or their representative has agreed to have anesthesia services.     ___

## (undated) NOTE — LETTER
10/12/19    Casi Yo      To Whom It May Concern: The above patient was seen at [facility] for treatment of a medical condition from 10/10/2019-10/12/2019.     The patient may return to work once cleared by her urologist as outpatient onc